# Patient Record
Sex: MALE | Race: WHITE | NOT HISPANIC OR LATINO | Employment: FULL TIME | URBAN - METROPOLITAN AREA
[De-identification: names, ages, dates, MRNs, and addresses within clinical notes are randomized per-mention and may not be internally consistent; named-entity substitution may affect disease eponyms.]

---

## 2017-09-22 ENCOUNTER — OFFICE VISIT (OUTPATIENT)
Dept: INTERNAL MEDICINE | Facility: CLINIC | Age: 27
End: 2017-09-22
Payer: COMMERCIAL

## 2017-09-22 VITALS
HEART RATE: 53 BPM | SYSTOLIC BLOOD PRESSURE: 106 MMHG | DIASTOLIC BLOOD PRESSURE: 60 MMHG | BODY MASS INDEX: 20.35 KG/M2 | HEIGHT: 76 IN | WEIGHT: 167.13 LBS | TEMPERATURE: 99 F

## 2017-09-22 DIAGNOSIS — S93.402A SPRAIN OF LEFT ANKLE, UNSPECIFIED LIGAMENT, INITIAL ENCOUNTER: Primary | ICD-10-CM

## 2017-09-22 PROCEDURE — 3008F BODY MASS INDEX DOCD: CPT | Mod: S$GLB,,, | Performed by: INTERNAL MEDICINE

## 2017-09-22 PROCEDURE — 99999 PR PBB SHADOW E&M-NEW PATIENT-LVL III: CPT | Mod: PBBFAC,,, | Performed by: INTERNAL MEDICINE

## 2017-09-22 PROCEDURE — 99203 OFFICE O/P NEW LOW 30 MIN: CPT | Mod: S$GLB,,, | Performed by: INTERNAL MEDICINE

## 2017-09-22 NOTE — PROGRESS NOTES
"Subjective:       Patient ID: Doron Baltazar is a 27 y.o. male.    Chief Complaint: Ankle Injury    HPI - Doron rolled his ankle playing kickball last night and wanted to make sure it's not broken.  He can bear weight without difficulty.  No swelling or bruising at this point.  GF is an IM resident and wants someone to check "[place] rules" for imaging.  He's not a smoker    Pmh/meds:  None    Review of Systems   Constitutional: Negative for activity change and unexpected weight change.   HENT: Negative for hearing loss, rhinorrhea and trouble swallowing.    Eyes: Negative for discharge and visual disturbance.   Respiratory: Negative for chest tightness and wheezing.    Cardiovascular: Negative for chest pain and palpitations.   Gastrointestinal: Negative for blood in stool, constipation, diarrhea and vomiting.   Endocrine: Negative for polydipsia and polyuria.   Genitourinary: Negative for difficulty urinating, hematuria and urgency.   Musculoskeletal: Positive for arthralgias. Negative for joint swelling and neck pain.   Skin: Negative for rash.   Neurological: Negative for weakness and headaches.   Psychiatric/Behavioral: Negative for confusion and dysphoric mood.       Objective:      Physical Exam   Constitutional: He appears well-developed and well-nourished.   Tall thin well-appearing young man in NAD   HENT:   Head: Normocephalic and atraumatic.   Musculoskeletal:   Left ankle with mild edema, no bruising.  No bony tenderness; negative drawer's sign.  Normal pulses   Neurological: He is alert.   Skin: He is not diaphoretic.   Nursing note and vitals reviewed.      Assessment:       1. Sprain of left ankle, unspecified ligament, initial encounter        Plan:       Doron was seen today for ankle injury.    Diagnoses and all orders for this visit:    Sprain of left ankle, unspecified ligament, initial encounter -  New patient, new problem.  No indication for imaging.  Discussed RICE to speed " recovery.  He understood.    rtc prn.    G Oleg Dia MD MPH  Staff Internist

## 2018-04-30 ENCOUNTER — OFFICE VISIT (OUTPATIENT)
Dept: SPORTS MEDICINE | Facility: CLINIC | Age: 28
End: 2018-04-30
Payer: COMMERCIAL

## 2018-04-30 ENCOUNTER — HOSPITAL ENCOUNTER (OUTPATIENT)
Dept: RADIOLOGY | Facility: HOSPITAL | Age: 28
Discharge: HOME | End: 2018-04-30
Attending: ORTHOPAEDIC SURGERY
Payer: COMMERCIAL

## 2018-04-30 VITALS
HEART RATE: 55 BPM | BODY MASS INDEX: 20.35 KG/M2 | DIASTOLIC BLOOD PRESSURE: 74 MMHG | SYSTOLIC BLOOD PRESSURE: 123 MMHG | HEIGHT: 76 IN | WEIGHT: 167.13 LBS

## 2018-04-30 DIAGNOSIS — M25.551 RIGHT HIP PAIN: Primary | ICD-10-CM

## 2018-04-30 DIAGNOSIS — M25.851 FEMOROACETABULAR IMPINGEMENT OF RIGHT HIP: ICD-10-CM

## 2018-04-30 DIAGNOSIS — M25.551 RIGHT HIP PAIN: ICD-10-CM

## 2018-04-30 DIAGNOSIS — M70.61 GREATER TROCHANTERIC BURSITIS OF RIGHT HIP: ICD-10-CM

## 2018-04-30 PROCEDURE — 73521 X-RAY EXAM HIPS BI 2 VIEWS: CPT | Mod: TC,FY,PO

## 2018-04-30 PROCEDURE — 99204 OFFICE O/P NEW MOD 45 MIN: CPT | Mod: S$GLB,,, | Performed by: ORTHOPAEDIC SURGERY

## 2018-04-30 PROCEDURE — 73521 X-RAY EXAM HIPS BI 2 VIEWS: CPT | Mod: 26,,, | Performed by: RADIOLOGY

## 2018-04-30 PROCEDURE — 99999 PR PBB SHADOW E&M-EST. PATIENT-LVL IV: CPT | Mod: PBBFAC,,, | Performed by: ORTHOPAEDIC SURGERY

## 2018-04-30 RX ORDER — MELOXICAM 15 MG/1
15 TABLET ORAL DAILY
Qty: 30 TABLET | Refills: 2 | Status: SHIPPED | OUTPATIENT
Start: 2018-04-30 | End: 2018-05-30

## 2018-04-30 NOTE — PROGRESS NOTES
Subjective:          Chief Complaint: Doron Baltazar is a 28 y.o. male who had concerns including Pain of the Right Hip.    Here for evaluation of bilateral hip pain. Says he had a spiral fracture of right femur as a toddler and treated conservatively in a cast. He's always had pain and discomfort in his right leg especially when active. He has used orthotics in high school. He has tingling in his posterior femur. Has not tried PT or medications other than ibuprofen. Has not had an injection        Pain   Pertinent negatives include no chest pain, fever, joint swelling, numbness or rash.       Review of Systems   Constitution: Negative for fever and night sweats.   HENT: Negative for hearing loss.    Eyes: Negative for blurred vision and visual disturbance.   Cardiovascular: Negative for chest pain and leg swelling.   Respiratory: Negative for shortness of breath.    Endocrine: Negative for polyuria.   Hematologic/Lymphatic: Negative for bleeding problem.   Skin: Negative for rash.   Musculoskeletal: Positive for joint pain. Negative for back pain, joint swelling, muscle cramps and muscle weakness.   Gastrointestinal: Negative for melena.   Genitourinary: Negative for hematuria.   Neurological: Negative for loss of balance, numbness and paresthesias.   Psychiatric/Behavioral: Negative for altered mental status.       Pain Related Questions  Over the past 3 days, what was your average pain during activity? (I.e. running, jogging, walking, climbing stairs, getting dressed, ect.): 3  Over the past 3 days, what was your highest pain level?: 3  Over the past 3 days, what was your lowest pain level? : 0    Other  How many nights a week are you awakened by your affected body part?: 0  Was the patient's HEIGHT measured or patient reported?: Patient Reported  Was the patient's WEIGHT measured or patient reported?: Measured      Objective:        General: Doron is well-developed, well-nourished, appears stated age,  in no acute distress, alert and oriented to time, place and person.     General    Vitals reviewed.  Constitutional: He is oriented to person, place, and time. He appears well-developed and well-nourished. No distress.   HENT:   Mouth/Throat: No oropharyngeal exudate.   Eyes: Right eye exhibits no discharge. Left eye exhibits no discharge.   Neck: Normal range of motion.   Cardiovascular: Normal rate.    Pulmonary/Chest: Effort normal and breath sounds normal. No respiratory distress.   Neurological: He is alert and oriented to person, place, and time. He has normal reflexes. No cranial nerve deficit. Coordination normal.   Psychiatric: He has a normal mood and affect. His behavior is normal. Judgment and thought content normal.     General Musculoskeletal Exam   Gait: normal   Pelvic Obliquity: none      Right Knee Exam     Inspection   Alignment:  normal  Effusion: effusion    Left Knee Exam     Inspection   Alignment:  normal  Effusion: absent    Right Hip Exam     Inspection   Scars: absent  Swelling: absent  Bruising: absent  No deformity of hip.  Quadriceps Atrophy:  Negative  Erythema: absent    Tenderness   The patient tender to palpation of the trochanteric bursa.    Range of Motion   Extension:  0 normal   Flexion:  130 normal   Internal Rotation:  10 normal   External Rotation:  80 normal   Abduction:  30 normal   Adduction:  20 normal     Tests   Pain w/ forced internal rotation (ANASTASIA): absent  Pain w/ forced external rotation (FADIR): present  Kathy: negative  Trendelenburg Test: negative  Circumduction test: negative  Stinchfield test: negative  Log Roll: negative  Snapping Hip (internal): negative  Step-down test: negative  Resisted sit-up pain: negative  Unresisted sit-up pain: negative  Resisted sit-up pain with adductor contraction pain: negative    Other   Sensation: normal  Left Hip Exam     Inspection   Scars: absent  Swelling: absent  No deformity of hip.  Quadriceps Atrophy:   negative  Erythema: absent  Bruising: absent    Range of Motion   Extension:  0 normal   Flexion:  130 normal   Internal Rotation: 10 normal   External Rotation:  80 normal   Abduction:  45 normal   Adduction:  20 normal     Tests   Pain w/ forced internal rotation (ANASTASIA): absent  Pain w/ forced external rotation (FADIR): absent  Kathy: negative  Trendelenburg Test: negative  Circumduction test: negative  Stinchfield test: negative  Log Roll: negative  Snapping Hip (internal): negative  Step-down test: negative  Resisted sit-up pain: negative  Resisted sit-up pain with adductor contraction pain: negative  Unresisted sit-up pain: negative    Other   Sensation: normal      Back (L-Spine & T-Spine) / Neck (C-Spine) Exam   Back exam is normal.      Muscle Strength   Right Lower Extremity   Hip Abduction: 5/5   Hip Adduction: 5/5   Hip Flexion: 5/5   Left Lower Extremity   Hip Abduction: 5/5   Hip Adduction: 5/5   Hip Flexion: 5/5     Reflexes     Left Side  Quadriceps:  2+  Achilles:  2+    Right Side   Quadriceps:  2+  Achilles:  2+    Vascular Exam     Right Pulses  Dorsalis Pedis:      2+  Posterior Tibial:      2+        Left Pulses  Dorsalis Pedis:      2+  Posterior Tibial:      2+        Capillary Refill  Right Hand: normal capillary refill  Left Hand: normal capillary refill    Edema  Right Upper Leg: absent  Left Upper Leg: absent      RADIOGRAPHS TODAY: Bones are well mineralized.  There is some hypertrophic new bone about the superior lateral aspect of the acetabula which may represent FAIP.  Otherwise bones are well mineralized.    Right leg 107  Left leg 108    LCEA right hip 32  Cam moderate  Alpha angle 55      Assessment:       Encounter Diagnoses   Name Primary?    Right hip pain Yes    Femoroacetabular impingement of right hip     Greater trochanteric bursitis of right hip           Plan:       1. [place] Hip Score was filled out today in clinic.     RTC in 2 weeks with Dr. uLiz Nj for MRA  results. Patient will fill out [place] Hip Score on return.    2. R hip injection with Dr Chico Herrera, intraarticular CSI after MRI results    3. MR arthrogram right hip to evaluate R hip labrum    4. Meloxicam 15 mg po daily    5. Custom orthotics ordered today              [facility] patient questionnaires have been collected today.

## 2018-04-30 NOTE — PATIENT INSTRUCTIONS
Hip Arthroscopy: Repairing Femoroacetabular Impingement           When excess bone has formed on the edge of the ball (femoral head) or the socket (acetabulum) of the hip, it is called femoroacetabular impingement (CHATO). CHATO can cause pain and limit movement. Arthroscopy can repair CHATO with only small incisions and special instruments.  In the operating room  Just before surgery, you may be asked several times which hip is to be treated. This is a standard safety measure. In the operating room, you will likely receive general anesthesia to make you sleep.  During the procedure  After you receive anesthesia, your leg is gently pulled to distract, or widen, the hip joint. Next, the surgeon makes a few small incisions called portals. Through these portals, he or she inserts surgical tools, including the arthroscope. The arthroscope sends images of the joint to a video screen. These images allow the surgeon to look inside the joint. The joint is filled with sterile fluid to help the surgeon see more clearly.  Repairing femoroacetabular impingement  To treat CHATO, the area is reshaped by removing excess bone. Excess bone can be removed from the socket side or ball side of the hip joint, or both. CHATO can lead to other problems, such as labral tears or chondral damage. If present, these problems are also treated. Once the surgeon finishes the procedure, the portals are closed and bandaged. Then you are taken to the recovery room.  Date Last Reviewed: 7/1/2016  © 9406-2304 [employer]. [address] [place] [address], [place] [address]. All rights reserved. This information is not intended as a substitute for professional medical care. Always follow your healthcare professional's instructions.        Hip Arthroscopy: Repairing Labral Tears     During arthroscopy, the hip joint is gently widened (distracted) to allow access to the joint.                The strong, flexible ring of cartilage attached to the  edge of the hip socket is called the labrum. When the labrum is torn, there can be symptoms of pain, catching, clicking, or locking in the joint. Arthroscopy is a surgical procedure that uses special instruments inserted through small incisions. With these tools, your surgeon can repair or remove a torn labrum. Your healthcare provider has suggested this procedure called arthroscopy.  In the operating room  Just before surgery, your healthcare providers may ask you several times which hip is to be treated. This is a standard safety measure in the operating room. You will likely receive general anesthesia to make you sleep.  During the procedure  After you receive anesthesia, your leg is gently pulled to widen the hip joint. Next, your surgeon makes a few small incisions called portals. Through these portals, he or she inserts surgical tools, including the arthroscope. The arthroscope sends images of your joint to a video screen. These images allow your surgeon to look inside your joint. The joint is filled with sterile fluid to help your surgeon see more clearly.  Repairing labral tears  Labral tears can be removed or repaired. The torn piece of labrum may be removed by cutting, shaving, or heating the tissue to remove it (ablation). A repair of the torn labrum may be done by suturing the tear to the bone. In this case, an anchor is placed in the bone and a suture ties the labrum to the anchor. Some bone may be removed during this process using arthroscopic instruments. Your surgeon also uses arthroscopic instruments to pass the suture through tissue and tie knots through the small arthroscopic portals. Once your surgeon finishes the procedure, he or she closes the portals and bandages them. Then you are taken to the recovery room.  Date Last Reviewed: 8/28/2015  © 8745-5174 [employer]. [address] [place] [address], [place] [address]. All rights reserved. This information is not intended as a substitute for  professional medical care. Always follow your healthcare professional's instructions.        Hip Arthroscopy: Repairing Chondral Damage           When the firm, smooth tissue that covers the ball and socket of the hip (joint cartilage) is damaged, it can cause painful catching in the joint.  Arthroscopy, a surgical technique that requires only small incisions and special instruments, can repair chondral (cartilage) damage.   In the operating room  Just before surgery, you may be asked several times which hip is to be treated. This is a standard safety measure in the operating room. You will likely receive general anesthesia to make you sleep.  During the procedure  After you receive anesthesia, your leg is gently pulled to widen, the hip joint. Next, the surgeon makes a few small incisions called portals. Through these portals, he or she inserts surgical tools, including the arthroscope. The arthroscope sends images of the joint to a video screen. These images allow the surgeon to look inside the joint. The joint is filled with sterile fluid to help the surgeon see more clearly.  Repairing chondral damage  If the damaged cartilage is loose, it is removed. If the cartilage is missing, the exposed bone may be shaved to smooth it. Or, small holes may be placed in the bone (microfracture). This allows new cartilage to form. Once the surgeon finishes the procedure, the portals are closed and bandaged. Then you are taken to the recovery room.  Date Last Reviewed: 8/28/2015  © 2195-2920 [employer]. [address] [place] [address]. All rights reserved. This information is not intended as a substitute for professional medical care. Always follow your healthcare professional's instructions.        Hip Arthroscopy: Repairing Synovitis and Arthritis           Synovitis of the hip causes the lining of the joint (synovium) to become inflamed, painful and swollen. With arthritis, a large amount of the  firm, smooth tissue covering the ball and socket of the hip (cartilage) is damaged. Using only small incisions and special instruments, arthroscopy can repair synovitis and arthritis.  In the operating room  Just before surgery, you may be asked several times which hip is to be treated. This is a standard safety measure. In the operating room. You will likely receive general anesthesia to make you sleep.  During the procedure  After you receive anesthesia, your leg is gently pulled to widen, the hip joint. Next, the surgeon makes a few small incisions called portals. Through these portals, he or she inserts surgical tools, including the arthroscope. The arthroscope sends images of the joint to a video screen. These images allow the surgeon to look inside the joint. The joint is filled with sterile fluid to help the surgeon see more clearly.  Repairing synovitis and arthritis  For synovitis, part of the damaged synovium is removed. This may be done by shaving tissue using arthroscopic instruments. For arthritis, the loose or torn cartilage is removed, or it may be smoothed with a shaver. Arthritis can lead to bone spurs. If bone spurs are present, they may be removed. Once the surgeon finishes the procedure, the portals are closed and bandaged. Then you are taken to the recovery room.  Date Last Reviewed: 8/28/2015 © 2000-2017 [employer]. [address] [place] [address], [place] [address]. All rights reserved. This information is not intended as a substitute for professional medical care. Always follow your healthcare professional's instructions.

## 2018-05-09 ENCOUNTER — TELEPHONE (OUTPATIENT)
Dept: SPORTS MEDICINE | Facility: CLINIC | Age: 28
End: 2018-05-09

## 2018-05-09 ENCOUNTER — TELEPHONE (OUTPATIENT)
Dept: RADIOLOGY | Facility: HOSPITAL | Age: 28
End: 2018-05-09

## 2018-05-09 DIAGNOSIS — M25.851 FEMOROACETABULAR IMPINGEMENT OF RIGHT HIP: Primary | ICD-10-CM

## 2018-05-09 NOTE — TELEPHONE ENCOUNTER
----- Message from Lilia Phillips MA sent at 5/9/2018  4:34 PM CDT -----  Contact: Pt       ----- Message -----  From: Mac Edwards  Sent: 5/9/2018   1:18 PM  To: Clem BARRAGAN Staff    Pt has a question regarding his MRI for tomorrow.    Call

## 2019-02-01 ENCOUNTER — TELEPHONE (OUTPATIENT)
Dept: INTERNAL MEDICINE | Facility: CLINIC | Age: 29
End: 2019-02-01

## 2019-02-01 NOTE — TELEPHONE ENCOUNTER
----- Message from Christina Heredia sent at 2/1/2019 11:04 AM CST -----  Contact: [phone]  Caller is requesting a sooner appointment. Caller declined first available appointment listed below. Caller will not accept being placed on the wait list and is requesting a message be sent to the provider.    When is the next available appointment:  April   Did you offer to schedule the next available appt and put the patient on the wait list?:  Yes   What visit type: follow up  Symptoms:   Patient is going out of town and needs to get some vaccines   Patient preference of timeframe to be scheduled:  asap   What is the reason the patient is requesting a sooner appointment? (insurance terminating, changing jobs):    Would you prefer an answer via [employer]?:  no  Comments:  Please advise, thanks

## 2019-02-05 ENCOUNTER — OFFICE VISIT (OUTPATIENT)
Dept: INTERNAL MEDICINE | Facility: CLINIC | Age: 29
End: 2019-02-05
Payer: COMMERCIAL

## 2019-02-05 ENCOUNTER — IMMUNIZATION (OUTPATIENT)
Dept: PHARMACY | Facility: CLINIC | Age: 29
End: 2019-02-05
Payer: COMMERCIAL

## 2019-02-05 VITALS
HEIGHT: 76 IN | WEIGHT: 172.19 LBS | BODY MASS INDEX: 20.97 KG/M2 | OXYGEN SATURATION: 99 % | SYSTOLIC BLOOD PRESSURE: 98 MMHG | DIASTOLIC BLOOD PRESSURE: 62 MMHG | HEART RATE: 64 BPM

## 2019-02-05 DIAGNOSIS — Z71.84 TRAVEL ADVICE ENCOUNTER: Primary | ICD-10-CM

## 2019-02-05 DIAGNOSIS — Z29.89 NEED FOR MALARIA PROPHYLAXIS: ICD-10-CM

## 2019-02-05 DIAGNOSIS — A09 TRAVELER'S DIARRHEA: ICD-10-CM

## 2019-02-05 PROCEDURE — 3008F PR BODY MASS INDEX (BMI) DOCUMENTED: ICD-10-PCS | Mod: CPTII,S$GLB,, | Performed by: INTERNAL MEDICINE

## 2019-02-05 PROCEDURE — 99999 PR PBB SHADOW E&M-EST. PATIENT-LVL III: ICD-10-PCS | Mod: PBBFAC,,, | Performed by: INTERNAL MEDICINE

## 2019-02-05 PROCEDURE — 99214 OFFICE O/P EST MOD 30 MIN: CPT | Mod: S$GLB,,, | Performed by: INTERNAL MEDICINE

## 2019-02-05 PROCEDURE — 3008F BODY MASS INDEX DOCD: CPT | Mod: CPTII,S$GLB,, | Performed by: INTERNAL MEDICINE

## 2019-02-05 PROCEDURE — 99999 PR PBB SHADOW E&M-EST. PATIENT-LVL III: CPT | Mod: PBBFAC,,, | Performed by: INTERNAL MEDICINE

## 2019-02-05 PROCEDURE — 99214 PR OFFICE/OUTPT VISIT, EST, LEVL IV, 30-39 MIN: ICD-10-PCS | Mod: S$GLB,,, | Performed by: INTERNAL MEDICINE

## 2019-02-05 RX ORDER — CIPROFLOXACIN 500 MG/1
500 TABLET ORAL EVERY 12 HOURS
Qty: 6 TABLET | Refills: 0 | Status: SHIPPED | OUTPATIENT
Start: 2019-02-05 | End: 2019-02-08

## 2019-02-05 RX ORDER — MEFLOQUINE HYDROCHLORIDE 250 MG/1
250 TABLET ORAL
Qty: 7 TABLET | Refills: 0 | Status: SHIPPED | OUTPATIENT
Start: 2019-02-05 | End: 2019-02-13 | Stop reason: SDUPTHER

## 2019-02-05 NOTE — PROGRESS NOTES
Subjective:       Patient ID: Doron Baltazar is a 29 y.o. male.    Chief Complaint: Immunizations    HPI - Doron is traveling to [place] and wants to talk about vaccines.  [place] just in cities.  He doesn't have an immunization record with him, and isn't in the system here in [place].  He doesn't smoke.    PMH/Meds:  None    Review of Systems   Constitutional: Negative for activity change and unexpected weight change.   HENT: Negative for hearing loss, rhinorrhea and trouble swallowing.    Eyes: Negative for discharge and visual disturbance.   Respiratory: Negative for chest tightness and wheezing.    Cardiovascular: Negative for chest pain and palpitations.   Gastrointestinal: Negative for blood in stool, constipation, diarrhea and vomiting.   Endocrine: Negative for polydipsia and polyuria.   Genitourinary: Negative for difficulty urinating, hematuria and urgency.   Musculoskeletal: Negative for arthralgias, joint swelling and neck pain.   Skin: Negative for rash.   Neurological: Negative for weakness and headaches.   Psychiatric/Behavioral: Negative for confusion and dysphoric mood.       Objective:      Physical Exam   Constitutional: He appears well-developed and well-nourished. No distress.   HENT:   Head: Normocephalic and atraumatic.   Neurological: He is alert.   Skin: He is not diaphoretic.   Psychiatric: He has a normal mood and affect.   Nursing note and vitals reviewed.      Assessment:       1. Travel advice encounter    2. Need for malaria prophylaxis    3. Traveler's diarrhea        Plan:       Doron was seen today for immunizations.    Diagnoses and all orders for this visit:    Travel advice encounter - According to the CDC website, he will require malaria prophylaxis.  Needs routine immunizations along with hepatitis A and yellow fever.  Will give the hep A today; he'll call to let me know his status WRT the rest of the immunizations  -      mefloquine (LARIAM) 250 mg tablet; Take 1 tablet (250 mg total) by mouth every 7 days. Start one week before travel and continue 4 weeks after  -     (In Office Administered) Hepatitis A Vaccine (Adult) (IM)    Need for malaria prophylaxis  -     mefloquine (LARIAM) 250 mg tablet; Take 1 tablet (250 mg total) by mouth every 7 days. Start one week before travel and continue 4 weeks after    Traveler's diarrhea - giving prophylaxis  -     ciprofloxacin HCl (CIPRO) 500 MG tablet; Take 1 tablet (500 mg total) by mouth every 12 (twelve) hours. for 3 days    rtc prn    LAUREL Dia MD MPH  Staff Internist

## 2019-02-12 ENCOUNTER — PATIENT MESSAGE (OUTPATIENT)
Dept: INTERNAL MEDICINE | Facility: CLINIC | Age: 29
End: 2019-02-12

## 2019-02-12 DIAGNOSIS — Z71.84 TRAVEL ADVICE ENCOUNTER: Primary | ICD-10-CM

## 2019-02-12 DIAGNOSIS — Z29.89 NEED FOR MALARIA PROPHYLAXIS: ICD-10-CM

## 2019-02-13 RX ORDER — TRIAMCINOLONE ACETONIDE 1 MG/G
OINTMENT TOPICAL 2 TIMES DAILY
Qty: 30 G | Refills: 0 | Status: SHIPPED | OUTPATIENT
Start: 2019-02-13 | End: 2019-07-23

## 2019-02-13 RX ORDER — MEFLOQUINE HYDROCHLORIDE 250 MG/1
250 TABLET ORAL
Qty: 7 TABLET | Refills: 0 | Status: SHIPPED | OUTPATIENT
Start: 2019-02-13 | End: 2019-07-23

## 2019-02-13 RX ORDER — CIPROFLOXACIN 500 MG/1
500 TABLET ORAL 2 TIMES DAILY
Qty: 10 TABLET | Refills: 0 | Status: SHIPPED | OUTPATIENT
Start: 2019-02-13 | End: 2019-02-18

## 2019-02-18 ENCOUNTER — CLINICAL SUPPORT (OUTPATIENT)
Dept: INFECTIOUS DISEASES | Facility: CLINIC | Age: 29
End: 2019-02-18
Payer: COMMERCIAL

## 2019-02-18 PROCEDURE — 90471 TDAP VACCINE GREATER THAN OR EQUAL TO 7YO IM: ICD-10-PCS | Mod: S$GLB,,, | Performed by: INTERNAL MEDICINE

## 2019-02-18 PROCEDURE — 90472 YELLOW FEVER VACCINE SQ: ICD-10-PCS | Mod: S$GLB,,, | Performed by: INTERNAL MEDICINE

## 2019-02-18 PROCEDURE — 90715 TDAP VACCINE 7 YRS/> IM: CPT | Mod: S$GLB,,, | Performed by: INTERNAL MEDICINE

## 2019-02-18 PROCEDURE — 90717 YELLOW FEVER VACCINE SUBQ: CPT | Mod: S$GLB,,, | Performed by: INTERNAL MEDICINE

## 2019-02-18 PROCEDURE — 90715 TDAP VACCINE GREATER THAN OR EQUAL TO 7YO IM: ICD-10-PCS | Mod: S$GLB,,, | Performed by: INTERNAL MEDICINE

## 2019-02-18 PROCEDURE — 90472 IMMUNIZATION ADMIN EACH ADD: CPT | Mod: S$GLB,,, | Performed by: INTERNAL MEDICINE

## 2019-02-18 PROCEDURE — 90471 IMMUNIZATION ADMIN: CPT | Mod: S$GLB,,, | Performed by: INTERNAL MEDICINE

## 2019-02-18 PROCEDURE — 90717 YELLOW FEVER VACCINE SQ: ICD-10-PCS | Mod: S$GLB,,, | Performed by: INTERNAL MEDICINE

## 2019-03-01 ENCOUNTER — CLINICAL SUPPORT (OUTPATIENT)
Dept: INFECTIOUS DISEASES | Facility: CLINIC | Age: 29
End: 2019-03-01
Payer: COMMERCIAL

## 2019-03-01 PROCEDURE — 90471 IMMUNIZATION ADMIN: CPT | Mod: S$GLB,,, | Performed by: INTERNAL MEDICINE

## 2019-03-01 PROCEDURE — 90691 TYPHOID VACCINE IM: CPT | Mod: S$GLB,,, | Performed by: INTERNAL MEDICINE

## 2019-03-01 PROCEDURE — 90691 TYPHOID VICPS VACCINE IM: ICD-10-PCS | Mod: S$GLB,,, | Performed by: INTERNAL MEDICINE

## 2019-03-01 PROCEDURE — 90471 TYPHOID VICPS VACCINE IM: ICD-10-PCS | Mod: S$GLB,,, | Performed by: INTERNAL MEDICINE

## 2019-07-22 ENCOUNTER — PATIENT OUTREACH (OUTPATIENT)
Dept: ADMINISTRATIVE | Facility: HOSPITAL | Age: 29
End: 2019-07-22

## 2019-07-22 DIAGNOSIS — Z13.220 NEED FOR LIPID SCREENING: Primary | ICD-10-CM

## 2019-07-22 NOTE — PROGRESS NOTES
Pt due for lipid, order placed. Chart review complete. Care team updated, immunizations reconciled.

## 2019-07-23 ENCOUNTER — OFFICE VISIT (OUTPATIENT)
Dept: INTERNAL MEDICINE | Facility: CLINIC | Age: 29
End: 2019-07-23
Payer: COMMERCIAL

## 2019-07-23 VITALS
SYSTOLIC BLOOD PRESSURE: 114 MMHG | WEIGHT: 172.63 LBS | BODY MASS INDEX: 21.02 KG/M2 | DIASTOLIC BLOOD PRESSURE: 70 MMHG | OXYGEN SATURATION: 99 % | HEIGHT: 76 IN | HEART RATE: 67 BPM

## 2019-07-23 DIAGNOSIS — Z00.00 ROUTINE HISTORY AND PHYSICAL EXAMINATION OF ADULT: Primary | ICD-10-CM

## 2019-07-23 DIAGNOSIS — R53.83 FATIGUE, UNSPECIFIED TYPE: ICD-10-CM

## 2019-07-23 DIAGNOSIS — Z80.8 FAMILY HISTORY OF SKIN CANCER: ICD-10-CM

## 2019-07-23 PROBLEM — M70.61 GREATER TROCHANTERIC BURSITIS OF RIGHT HIP: Status: RESOLVED | Noted: 2018-04-30 | Resolved: 2019-07-23

## 2019-07-23 PROBLEM — M25.851 FEMOROACETABULAR IMPINGEMENT OF RIGHT HIP: Status: RESOLVED | Noted: 2018-04-30 | Resolved: 2019-07-23

## 2019-07-23 PROCEDURE — 99395 PREV VISIT EST AGE 18-39: CPT | Mod: S$GLB,,, | Performed by: INTERNAL MEDICINE

## 2019-07-23 PROCEDURE — 99999 PR PBB SHADOW E&M-EST. PATIENT-LVL IV: ICD-10-PCS | Mod: PBBFAC,,, | Performed by: INTERNAL MEDICINE

## 2019-07-23 PROCEDURE — 99395 PR PREVENTIVE VISIT,EST,18-39: ICD-10-PCS | Mod: S$GLB,,, | Performed by: INTERNAL MEDICINE

## 2019-07-23 PROCEDURE — 99999 PR PBB SHADOW E&M-EST. PATIENT-LVL IV: CPT | Mod: PBBFAC,,, | Performed by: INTERNAL MEDICINE

## 2019-07-23 NOTE — PROGRESS NOTES
"Subjective:       Patient ID: Doron Baltazar is a 29 y.o. male.    Chief Complaint: Annual Exam    HPI - Doron has had fatigue for the past couple of months.  He took a PHQ-9 at home and scored "mild" depression.  He has mild anhedonia and a dysphoric mood at times; most days for the past two months.  He drinks minimally, doesn't smoke or use illicit substances.  He's sexually active with his wife, who is a med-peds resident.  He works as a  for a software company.    PMH:  None  Meds:  None    Review of Systems   Constitutional: Negative for activity change and unexpected weight change.   HENT: Negative for hearing loss, rhinorrhea and trouble swallowing.    Eyes: Negative for discharge and visual disturbance.   Respiratory: Negative for chest tightness and wheezing.    Cardiovascular: Negative for chest pain and palpitations.   Gastrointestinal: Negative for blood in stool, constipation, diarrhea and vomiting.   Endocrine: Negative for polydipsia and polyuria.   Genitourinary: Negative for difficulty urinating, hematuria and urgency.   Musculoskeletal: Negative for arthralgias, joint swelling and neck pain.   Skin: Negative for rash.   Neurological: Negative for weakness and headaches.   Psychiatric/Behavioral: Positive for dysphoric mood. Negative for confusion and sleep disturbance.       Objective:      Physical Exam   Constitutional: He is oriented to person, place, and time. He appears well-developed and well-nourished. No distress.   HENT:   Head: Normocephalic and atraumatic.   Cardiovascular: Normal rate, regular rhythm and normal heart sounds. Exam reveals no gallop and no friction rub.   No murmur heard.  Pulmonary/Chest: No respiratory distress. He has no wheezes. He has no rales. He exhibits no tenderness.   Abdominal: Soft. Bowel sounds are normal. He exhibits no distension and no mass. There is no tenderness. There is no rebound and no guarding. No hernia.   Neurological: He is " alert and oriented to person, place, and time.   Skin: Skin is warm. He is not diaphoretic. No erythema.   Psychiatric: He has a normal mood and affect. Thought content normal.   Nursing note and vitals reviewed.      Assessment:       1. Routine history and physical examination of adult    2. Fatigue, unspecified type    3. Family history of skin cancer        Plan:       Doron was seen today for annual exam.    Diagnoses and all orders for this visit:    Routine history and physical examination of adult - healthy young man; updating lab work  -     CBC auto differential; Future  -     Comprehensive metabolic panel; Future  -     Lipid panel; Future    Fatigue, unspecified type - new complaint.  Favor depression, as he screens positive.  No s/s of ANDERSON.  Will look broadly for thyroid and vitamin deficiency  -     TSH; Future  -     Vitamin D 25 hydroxy; Future  -     Vitamin B12; Future    Family history of skin cancer - sounds like parents have had BCC's.  He requests derm evaluation, though I saw nothing concerning  -     Ambulatory consult to Dermatology    rtc prn, or in a year    LAUREL Dia MD MPH  Staff Internist

## 2019-07-27 ENCOUNTER — LAB VISIT (OUTPATIENT)
Dept: LAB | Facility: HOSPITAL | Age: 29
End: 2019-07-27
Attending: INTERNAL MEDICINE
Payer: COMMERCIAL

## 2019-07-27 DIAGNOSIS — R53.83 FATIGUE, UNSPECIFIED TYPE: ICD-10-CM

## 2019-07-27 DIAGNOSIS — Z00.00 ROUTINE HISTORY AND PHYSICAL EXAMINATION OF ADULT: ICD-10-CM

## 2019-07-27 LAB
25(OH)D3+25(OH)D2 SERPL-MCNC: 21 NG/ML (ref 30–96)
ALBUMIN SERPL BCP-MCNC: 4.8 G/DL (ref 3.5–5.2)
ALP SERPL-CCNC: 67 U/L (ref 55–135)
ALT SERPL W/O P-5'-P-CCNC: 19 U/L (ref 10–44)
ANION GAP SERPL CALC-SCNC: 12 MMOL/L (ref 8–16)
AST SERPL-CCNC: 18 U/L (ref 10–40)
BASOPHILS # BLD AUTO: 0.03 K/UL (ref 0–0.2)
BASOPHILS NFR BLD: 0.5 % (ref 0–1.9)
BILIRUB SERPL-MCNC: 0.5 MG/DL (ref 0.1–1)
BUN SERPL-MCNC: 25 MG/DL (ref 6–20)
CALCIUM SERPL-MCNC: 10.2 MG/DL (ref 8.7–10.5)
CHLORIDE SERPL-SCNC: 104 MMOL/L (ref 95–110)
CHOLEST SERPL-MCNC: 161 MG/DL (ref 120–199)
CHOLEST/HDLC SERPL: 2.6 {RATIO} (ref 2–5)
CO2 SERPL-SCNC: 26 MMOL/L (ref 23–29)
CREAT SERPL-MCNC: 1 MG/DL (ref 0.5–1.4)
DIFFERENTIAL METHOD: NORMAL
EOSINOPHIL # BLD AUTO: 0.2 K/UL (ref 0–0.5)
EOSINOPHIL NFR BLD: 3.6 % (ref 0–8)
ERYTHROCYTE [DISTWIDTH] IN BLOOD BY AUTOMATED COUNT: 12.3 % (ref 11.5–14.5)
EST. GFR  (AFRICAN AMERICAN): >60 ML/MIN/1.73 M^2
EST. GFR  (NON AFRICAN AMERICAN): >60 ML/MIN/1.73 M^2
GLUCOSE SERPL-MCNC: 97 MG/DL (ref 70–110)
HCT VFR BLD AUTO: 44.4 % (ref 40–54)
HDLC SERPL-MCNC: 62 MG/DL (ref 40–75)
HDLC SERPL: 38.5 % (ref 20–50)
HGB BLD-MCNC: 14.4 G/DL (ref 14–18)
LDLC SERPL CALC-MCNC: 90.4 MG/DL (ref 63–159)
LYMPHOCYTES # BLD AUTO: 1.8 K/UL (ref 1–4.8)
LYMPHOCYTES NFR BLD: 31.3 % (ref 18–48)
MCH RBC QN AUTO: 28.9 PG (ref 27–31)
MCHC RBC AUTO-ENTMCNC: 32.4 G/DL (ref 32–36)
MCV RBC AUTO: 89 FL (ref 82–98)
MONOCYTES # BLD AUTO: 0.6 K/UL (ref 0.3–1)
MONOCYTES NFR BLD: 9.9 % (ref 4–15)
NEUTROPHILS # BLD AUTO: 3.1 K/UL (ref 1.8–7.7)
NEUTROPHILS NFR BLD: 54.7 % (ref 38–73)
NONHDLC SERPL-MCNC: 99 MG/DL
PLATELET # BLD AUTO: 261 K/UL (ref 150–350)
PMV BLD AUTO: 9.6 FL (ref 9.2–12.9)
POTASSIUM SERPL-SCNC: 5.4 MMOL/L (ref 3.5–5.1)
PROT SERPL-MCNC: 7.9 G/DL (ref 6–8.4)
RBC # BLD AUTO: 4.99 M/UL (ref 4.6–6.2)
SODIUM SERPL-SCNC: 142 MMOL/L (ref 136–145)
TRIGL SERPL-MCNC: 43 MG/DL (ref 30–150)
TSH SERPL DL<=0.005 MIU/L-ACNC: 1.93 UIU/ML (ref 0.4–4)
VIT B12 SERPL-MCNC: 688 PG/ML (ref 210–950)
WBC # BLD AUTO: 5.76 K/UL (ref 3.9–12.7)

## 2019-07-27 PROCEDURE — 82607 VITAMIN B-12: CPT

## 2019-07-27 PROCEDURE — 85025 COMPLETE CBC W/AUTO DIFF WBC: CPT

## 2019-07-27 PROCEDURE — 80053 COMPREHEN METABOLIC PANEL: CPT

## 2019-07-27 PROCEDURE — 36415 COLL VENOUS BLD VENIPUNCTURE: CPT

## 2019-07-27 PROCEDURE — 82306 VITAMIN D 25 HYDROXY: CPT

## 2019-07-27 PROCEDURE — 80061 LIPID PANEL: CPT

## 2019-07-27 PROCEDURE — 84443 ASSAY THYROID STIM HORMONE: CPT

## 2019-07-28 ENCOUNTER — PATIENT MESSAGE (OUTPATIENT)
Dept: INTERNAL MEDICINE | Facility: CLINIC | Age: 29
End: 2019-07-28

## 2019-11-04 ENCOUNTER — PATIENT OUTREACH (OUTPATIENT)
Dept: ADMINISTRATIVE | Facility: OTHER | Age: 29
End: 2019-11-04

## 2019-11-06 ENCOUNTER — INITIAL CONSULT (OUTPATIENT)
Dept: DERMATOLOGY | Facility: CLINIC | Age: 29
End: 2019-11-06
Payer: COMMERCIAL

## 2019-11-06 DIAGNOSIS — L81.4 LENTIGO: ICD-10-CM

## 2019-11-06 DIAGNOSIS — D22.9 MULTIPLE BENIGN NEVI: ICD-10-CM

## 2019-11-06 DIAGNOSIS — L73.8 SEBACEOUS GLAND HYPERPLASIA: ICD-10-CM

## 2019-11-06 DIAGNOSIS — Z12.83 SCREENING EXAM FOR SKIN CANCER: Primary | ICD-10-CM

## 2019-11-06 PROCEDURE — 99203 PR OFFICE/OUTPT VISIT, NEW, LEVL III, 30-44 MIN: ICD-10-PCS | Mod: S$GLB,,, | Performed by: DERMATOLOGY

## 2019-11-06 PROCEDURE — 99999 PR PBB SHADOW E&M-EST. PATIENT-LVL II: CPT | Mod: PBBFAC,,, | Performed by: DERMATOLOGY

## 2019-11-06 PROCEDURE — 99203 OFFICE O/P NEW LOW 30 MIN: CPT | Mod: S$GLB,,, | Performed by: DERMATOLOGY

## 2019-11-06 PROCEDURE — 99999 PR PBB SHADOW E&M-EST. PATIENT-LVL II: ICD-10-PCS | Mod: PBBFAC,,, | Performed by: DERMATOLOGY

## 2019-11-06 NOTE — LETTER
November 6, 2019      Tj Dia II, MD  [address]  [place] [address]           [facility] D[facility]  [address]  [place] [address]  Phone: [phone]  Fax: [phone]          Patient: Doron Baltazar   MR Number: [id number]   YOB: 1990   Date of Visit: 11/6/2019       Dear Dr. Tj Dia II:    Thank you for referring Doron Baltazar to me for evaluation. Attached you will find relevant portions of my assessment and plan of care.    If you have questions, please do not hesitate to call me. I look forward to following Doron Baltazar along with you.    Sincerely,    Ama Mitchell MD    Enclosure  CC:  No Recipients    If you would like to receive this communication electronically, please contact [email or web address] or [phone] to request more information on [employer] Link access.    For providers and/or their staff who would like to refer a patient to [facility], please contact us through our one-stop-shop provider referral line, [facility], at [phone].    If you feel you have received this communication in error or would no longer like to receive these types of communications, please e-mail [email or web address]

## 2019-11-06 NOTE — PROGRESS NOTES
"  Subjective:       Patient ID:  Doron Baltazar is a 29 y.o. male who presents for   Chief Complaint   Patient presents with    Skin Check     tbse     Patient is a 30 yo male present for tbse. Patient has family history of BCC.  Patient has spot on forehead for 6 months no symptoms no treatment.  It is not hurting or bleeding.  He was a  in the past and has freckles.  This may be his first skin exam, he is not sure.    Patient is here today for a "mole" check.   Pt has a history of  moderate sun exposure in the past.   Pt recalls several blistering sunburns in the past- no  Pt has history of tanning bed use- no  Pt has  had moles removed in the past- no  Pt has history of melanoma in first degree relatives-  No, but BCC    The patient denies any moles or growths of the skin that are rapidly growing, hurting, itching, bleeding, or changing colors.        Review of Systems   Constitutional: Negative for fever, chills, weight loss, weight gain, fatigue, night sweats and malaise.   Skin: Positive for activity-related sunscreen use. Negative for recent sunburn and wears hat.   Hematologic/Lymphatic: Does not bruise/bleed easily.        Objective:    Physical Exam   Constitutional: He appears well-developed and well-nourished.   Neurological: He is alert and oriented to person, place, and time.   Psychiatric: He has a normal mood and affect.   Skin:   Areas Examined (abnormalities noted in diagram):   Scalp / Hair Palpated and Inspected  Head / Face Inspection Performed  Neck Inspection Performed  Chest / Axilla Inspection Performed  Abdomen Inspection Performed  Genitals / Buttocks / Groin Inspection Performed  Back Inspection Performed  RUE Inspected  LUE Inspection Performed  RLE Inspected  LLE Inspection Performed  Nails and Digits Inspection Performed                   Diagram Legend     Erythematous scaling macule/papule c/w actinic keratosis       Vascular papule c/w angioma      Pigmented verrucoid " papule/plaque c/w seborrheic keratosis      Yellow umbilicated papule c/w sebaceous hyperplasia      Irregularly shaped tan macule c/w lentigo     1-2 mm smooth white papules consistent with Milia      Movable subcutaneous cyst with punctum c/w epidermal inclusion cyst      Subcutaneous movable cyst c/w pilar cyst      Firm pink to brown papule c/w dermatofibroma      Pedunculated fleshy papule(s) c/w skin tag(s)      Evenly pigmented macule c/w junctional nevus     Mildly variegated pigmented, slightly irregular-bordered macule c/w mildly atypical nevus      Flesh colored to evenly pigmented papule c/w intradermal nevus       Pink pearly papule/plaque c/w basal cell carcinoma      Erythematous hyperkeratotic cursted plaque c/w SCC      Surgical scar with no sign of skin cancer recurrence      Open and closed comedones      Inflammatory papules and pustules      Verrucoid papule consistent consistent with wart     Erythematous eczematous patches and plaques     Dystrophic onycholytic nail with subungual debris c/w onychomycosis     Umbilicated papule    Erythematous-base heme-crusted tan verrucoid plaque consistent with inflamed seborrheic keratosis     Erythematous Silvery Scaling Plaque c/w Psoriasis     See annotation      Assessment / Plan:        Screening exam for skin cancer  Total body skin examination performed today including at least 12 points as noted in physical examination. No lesions suspicious for malignancy noted.    Multiple benign nevi  Discussed ABCDE's of nevi.  Monitor for new mole or moles that are becoming bigger, darker, irritated, or developing irregular borders. Brochure provided.    Sebaceous gland hyperplasia  This is a common condition representing benign enlargement of the sebaceous lobule. It typically occurs in adulthood. Reassurance given to patient.     Lentigo  This is a benign hyperpigmented sun induced lesion. Daily sun protection will reduce the number of new lesions. Treatment  of these benign lesions are considered cosmetic.             No follow-ups on file.

## 2020-10-05 ENCOUNTER — PATIENT MESSAGE (OUTPATIENT)
Dept: ADMINISTRATIVE | Facility: HOSPITAL | Age: 30
End: 2020-10-05

## 2020-10-15 ENCOUNTER — TELEPHONE (OUTPATIENT)
Dept: INTERNAL MEDICINE | Facility: CLINIC | Age: 30
End: 2020-10-15

## 2020-10-15 NOTE — TELEPHONE ENCOUNTER
Spoke with pt and pt advised that Covid results will be back within 72 hrs. Pt travelling tomorrow but he has chosen to cancel trip.    Dania

## 2020-10-15 NOTE — TELEPHONE ENCOUNTER
----- Message from Evon Barker sent at 10/15/2020  2:25 PM CDT -----  Contact: [phone]  Patient called requesting orders for Covid test. Patient states he need the test for traveling tomorrow. Please calll and advise

## 2021-01-04 ENCOUNTER — PATIENT MESSAGE (OUTPATIENT)
Dept: ADMINISTRATIVE | Facility: HOSPITAL | Age: 31
End: 2021-01-04

## 2021-04-05 ENCOUNTER — PATIENT MESSAGE (OUTPATIENT)
Dept: ADMINISTRATIVE | Facility: HOSPITAL | Age: 31
End: 2021-04-05

## 2021-07-06 ENCOUNTER — PATIENT MESSAGE (OUTPATIENT)
Dept: ADMINISTRATIVE | Facility: HOSPITAL | Age: 31
End: 2021-07-06

## 2021-10-04 ENCOUNTER — PATIENT MESSAGE (OUTPATIENT)
Dept: ADMINISTRATIVE | Facility: HOSPITAL | Age: 31
End: 2021-10-04

## 2022-03-16 ENCOUNTER — PATIENT MESSAGE (OUTPATIENT)
Dept: ADMINISTRATIVE | Facility: HOSPITAL | Age: 32
End: 2022-03-16
Payer: COMMERCIAL

## 2022-03-17 ENCOUNTER — PATIENT OUTREACH (OUTPATIENT)
Dept: ADMINISTRATIVE | Facility: HOSPITAL | Age: 32
End: 2022-03-17
Payer: COMMERCIAL

## 2022-03-17 NOTE — PROGRESS NOTES
Health Maintenance Due   Topic Date Due    Hepatitis C Screening  Never done    HIV Screening  Never done     Triggered LINKS & reconciled immunizations.  Updated Care Everywhere.  FYI flag has been placed on chart stating pt has moved out of state & established care.  Care Teams has been updated.  Chart review completed.